# Patient Record
Sex: MALE | Race: WHITE | NOT HISPANIC OR LATINO | URBAN - METROPOLITAN AREA
[De-identification: names, ages, dates, MRNs, and addresses within clinical notes are randomized per-mention and may not be internally consistent; named-entity substitution may affect disease eponyms.]

---

## 2017-01-06 ENCOUNTER — COMMUNICATION - HEALTHEAST (OUTPATIENT)
Dept: INTERNAL MEDICINE | Facility: CLINIC | Age: 57
End: 2017-01-06

## 2017-01-09 ENCOUNTER — COMMUNICATION - HEALTHEAST (OUTPATIENT)
Dept: INTERNAL MEDICINE | Facility: CLINIC | Age: 57
End: 2017-01-09

## 2018-01-02 ENCOUNTER — COMMUNICATION - HEALTHEAST (OUTPATIENT)
Dept: INTERNAL MEDICINE | Facility: CLINIC | Age: 58
End: 2018-01-02

## 2018-01-02 DIAGNOSIS — F34.1 DYSTHYMIC DISORDER: ICD-10-CM

## 2018-05-01 ENCOUNTER — COMMUNICATION - HEALTHEAST (OUTPATIENT)
Dept: INTERNAL MEDICINE | Facility: CLINIC | Age: 58
End: 2018-05-01
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
## 2018-05-11 ENCOUNTER — RECORDS - HEALTHEAST (OUTPATIENT)
Dept: GENERAL RADIOLOGY | Facility: CLINIC | Age: 58
End: 2018-05-11
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
## 2018-05-23 ENCOUNTER — OFFICE VISIT - HEALTHEAST (OUTPATIENT)
Dept: INTERNAL MEDICINE | Facility: CLINIC | Age: 58
End: 2018-05-23

## 2018-05-23 DIAGNOSIS — F34.1 DYSTHYMIC DISORDER: ICD-10-CM

## 2018-05-23 DIAGNOSIS — Z13.1 ENCOUNTER FOR SCREENING FOR DIABETES MELLITUS: ICD-10-CM

## 2018-05-23 DIAGNOSIS — Z13.220 ENCOUNTER FOR SCREENING FOR LIPOID DISORDERS: ICD-10-CM

## 2018-05-23 DIAGNOSIS — E55.9 VITAMIN D DEFICIENCY: ICD-10-CM

## 2018-05-23 DIAGNOSIS — Z12.5 ENCOUNTER FOR PROSTATE CANCER SCREENING: ICD-10-CM

## 2018-05-23 DIAGNOSIS — Z00.00 ENCOUNTER FOR GENERAL ADULT MEDICAL EXAMINATION WITHOUT ABNORMAL FINDINGS: ICD-10-CM

## 2018-05-23 LAB
CHOLEST SERPL-MCNC: 181 MG/DL
FASTING STATUS PATIENT QL REPORTED: YES
FASTING STATUS PATIENT QL REPORTED: YES
GLUCOSE BLD-MCNC: 108 MG/DL (ref 70–99)
HDLC SERPL-MCNC: 54 MG/DL
LDLC SERPL CALC-MCNC: 116 MG/DL
PSA SERPL-MCNC: 0.9 NG/ML (ref 0–3.5)
TRIGL SERPL-MCNC: 53 MG/DL

## 2018-05-23 ASSESSMENT — MIFFLIN-ST. JEOR: SCORE: 1701.79

## 2018-05-24 LAB
25(OH)D3 SERPL-MCNC: 42.8 NG/ML (ref 30–80)
25(OH)D3 SERPL-MCNC: 42.8 NG/ML (ref 30–80)

## 2018-06-18 ENCOUNTER — COMMUNICATION - HEALTHEAST (OUTPATIENT)
Dept: INTERNAL MEDICINE | Facility: CLINIC | Age: 58
End: 2018-06-18

## 2018-06-18 DIAGNOSIS — F34.1 DYSTHYMIC DISORDER: ICD-10-CM

## 2018-06-21 ENCOUNTER — COMMUNICATION - HEALTHEAST (OUTPATIENT)
Dept: INTERNAL MEDICINE | Facility: CLINIC | Age: 58
End: 2018-06-21

## 2018-09-17 ENCOUNTER — COMMUNICATION - HEALTHEAST (OUTPATIENT)
Dept: INTERNAL MEDICINE | Facility: CLINIC | Age: 58
End: 2018-09-17

## 2018-09-17 DIAGNOSIS — F34.1 DYSTHYMIC DISORDER: ICD-10-CM

## 2019-01-07 ENCOUNTER — COMMUNICATION - HEALTHEAST (OUTPATIENT)
Dept: INTERNAL MEDICINE | Facility: CLINIC | Age: 59
End: 2019-01-07

## 2019-01-07 DIAGNOSIS — F34.1 DYSTHYMIC DISORDER: ICD-10-CM

## 2019-07-01 ENCOUNTER — COMMUNICATION - HEALTHEAST (OUTPATIENT)
Dept: INTERNAL MEDICINE | Facility: CLINIC | Age: 59
End: 2019-07-01

## 2019-07-01 DIAGNOSIS — F34.1 DYSTHYMIC DISORDER: ICD-10-CM

## 2019-09-12 ENCOUNTER — COMMUNICATION - HEALTHEAST (OUTPATIENT)
Dept: INTERNAL MEDICINE | Facility: CLINIC | Age: 59
End: 2019-09-12

## 2019-09-12 DIAGNOSIS — F34.1 DYSTHYMIC DISORDER: ICD-10-CM

## 2019-09-13 RX ORDER — MIRTAZAPINE 30 MG/1
TABLET, FILM COATED ORAL
Qty: 45 TABLET | Refills: 0 | Status: SHIPPED | OUTPATIENT
Start: 2019-09-13

## 2021-05-29 ENCOUNTER — RECORDS - HEALTHEAST (OUTPATIENT)
Dept: ADMINISTRATIVE | Facility: CLINIC | Age: 61
End: 2021-05-29

## 2021-05-30 ENCOUNTER — RECORDS - HEALTHEAST (OUTPATIENT)
Dept: ADMINISTRATIVE | Facility: CLINIC | Age: 61
End: 2021-05-30

## 2021-05-30 NOTE — TELEPHONE ENCOUNTER
RN cannot approve Refill Request    RN can NOT refill this medication overdue for office visits and/or labs.    Zion Rinaldi, [facility] Connection Triage/Med Refill 7/1/2019    Requested Prescriptions   Pending Prescriptions Disp Refills     mirtazapine (REMERON) 30 MG tablet [Pharmacy Med Name: MIRTAZAPINE 30MG TABLETS] 45 tablet 0     Sig: TAKE 1/2 TABLET BY MOUTH EVERY NIGHT AT BEDTIME       Tricyclics/Misc Antidepressant/Antianxiety Meds Refill Protocol Failed - 7/1/2019  8:51 AM        Failed - PCP or prescribing provider visit in last year     Last office visit with prescriber/PCP: Visit date not found OR same dept: Visit date not found OR same specialty: Visit date not found  Last physical: 5/23/2018 Last MTM visit: Visit date not found   Next visit within 3 mo: Visit date not found  Next physical within 3 mo: Visit date not found  Prescriber OR PCP: Joe Chaudhari MD  Last diagnosis associated with med order: 1. Depression With Anxiety  - mirtazapine (REMERON) 30 MG tablet [Pharmacy Med Name: MIRTAZAPINE 30MG TABLETS]; TAKE 1/2 TABLET BY MOUTH EVERY NIGHT AT BEDTIME  Dispense: 45 tablet; Refill: 0    If protocol passes may refill for 12 months if within 3 months of last provider visit (or a total of 15 months).

## 2021-06-01 VITALS — BODY MASS INDEX: 24.6 KG/M2 | WEIGHT: 185.6 LBS | HEIGHT: 73 IN

## 2021-06-01 NOTE — TELEPHONE ENCOUNTER
RN cannot approve Refill Request    RN can NOT refill this medication Protocol failed and NO refill given. Last office visit: Visit date not found Last Physical: 5/23/2018 Last MTM visit: Visit date not found Last visit same specialty: Visit date not found.  Next visit within 3 mo: Visit date not found  Next physical within 3 mo: Visit date not found      Breana Zhou, [facility] Connection Triage/Med Refill 9/12/2019    Requested Prescriptions   Pending Prescriptions Disp Refills     mirtazapine (REMERON) 30 MG tablet [Pharmacy Med Name: MIRTAZAPINE 30MG TABLETS] 45 tablet 0     Sig: TAKE 1/2 TABLET BY MOUTH EVERY NIGHT AT BEDTIME       Tricyclics/Misc Antidepressant/Antianxiety Meds Refill Protocol Failed - 9/12/2019 10:23 PM        Failed - PCP or prescribing provider visit in last year     Last office visit with prescriber/PCP: Visit date not found OR same dept: Visit date not found OR same specialty: Visit date not found  Last physical: 5/23/2018 Last MTM visit: Visit date not found   Next visit within 3 mo: Visit date not found  Next physical within 3 mo: Visit date not found  Prescriber OR PCP: Joe Chaudhari MD  Last diagnosis associated with med order: 1. Depression With Anxiety  - mirtazapine (REMERON) 30 MG tablet [Pharmacy Med Name: MIRTAZAPINE 30MG TABLETS]; TAKE 1/2 TABLET BY MOUTH EVERY NIGHT AT BEDTIME  Dispense: 45 tablet; Refill: 0    If protocol passes may refill for 12 months if within 3 months of last provider visit (or a total of 15 months).

## 2021-06-18 NOTE — PROGRESS NOTES
ASSESSMENT:  1. Encounter for general adult medical examination without abnormal findings  Doing well.:  Up-to-date.  Update PSA and labs.  Moving to [place] tomorrow    2. Depression With Anxiety  Printed prescription provided  - mirtazapine (REMERON) 30 MG tablet; Take 0.5 tablets (15 mg total) by mouth at bedtime.  Dispense: 45 tablet; Refill: 3    3. Encounter for prostate cancer screening  Update lab  - PSA (Prostatic-Specific Antigen), Annual Screen    4. Encounter for screening for diabetes mellitus  Update lab  - Glucose    5. Encounter for screening for lipoid disorders  Update lab  - Lipid Cascade    6. Vitamin D deficiency  Update lab  - Vitamin D, Total (25-Hydroxy)        PLAN:  Patient Instructions   You will be due for colonoscopy in 2011 plus 10 is 2021      Orders Placed This Encounter   Procedures     PSA (Prostatic-Specific Antigen), Annual Screen     Glucose     Lipid Cascade     Order Specific Question:   Fasting is required?     Answer:   Yes     Vitamin D, Total (25-Hydroxy)     Medications Discontinued During This Encounter   Medication Reason     gabapentin (NEURONTIN) 100 MG capsule Reorder     mirtazapine (REMERON) 30 MG tablet Reorder       No Follow-up on file.    CHIEF COMPLAINT:  Chief Complaint   Patient presents with     Annual Exam       HISTORY OF PRESENT ILLNESS:  Óscar is a 57 y.o. male presenting to the clinic today for a physical exam.  He is a .  He and his wife are moving to [place] in the [place] area tomorrow.  He reports feeling well    He has occasional mild chronic back pain    He sleeps well with the assistance of medications.  These been stable for years    REVIEW OF SYSTEMS:   No peripheral edema.  Normal bowels.  Normal bladder.  Sleeping well with the aid of medication.  No new symptoms.  All other systems are negative.    PFSH:  Does not smoke.  Appears completely administered.  Moving to [place] tomorrow.    History   Smoking Status  "    Never Smoker   Smokeless Tobacco     Never Used       Family History   Problem Relation Age of Onset     Lung cancer Mother      Pancreatic cancer Father 76     Jan 2008     Prostate cancer Father        Social History     Social History     Marital status:      Spouse name: N/A     Number of children: 1     Years of education: N/A     Occupational History     Hospital Employee      [employer]     Social History Main Topics     Smoking status: Never Smoker     Smokeless tobacco: Never Used     Alcohol use 3.0 - 3.6 oz/week     5 - 6 Glasses of wine per week     Drug use: Not on file     Sexual activity: Not on file     Other Topics Concern     Not on file     Social History Narrative       Past Surgical History:   Procedure Laterality Date     INGUINAL HERNIA REPAIR       TX EXCIS CERV DISK,ONE LEVEL      Description: Laminectomy With Disc Removal;  Proc Date: 11/21/2005;  Comments: L 4- L 5     TX REMOVE TONSILS/ADENOIDS,<13 Y/O      Description: Tonsillectomy With Adenoidectomy;  Recorded: 07/16/2008;       No Known Allergies    Active Ambulatory Problems     Diagnosis Date Noted     Vitamin B12 Deficiency      Vitamin D Deficiency      Insomnia      Depression With Anxiety      Lower Back Pain      Resolved Ambulatory Problems     Diagnosis Date Noted     Tachycardia      Past Medical History:   Diagnosis Date     Closed fracture        VITALS:  Vitals:    05/23/18 1005   BP: 124/78   Patient Site: Left Arm   Patient Position: Sitting   Cuff Size: Adult Regular   Pulse: 74   SpO2: 99%   Weight: 185 lb 9.6 oz (84.2 kg)   Height: 6' 0.75\" (1.848 m)     Wt Readings from Last 3 Encounters:   05/23/18 185 lb 9.6 oz (84.2 kg)   12/29/16 189 lb 6.4 oz (85.9 kg)     Body mass index is 24.66 kg/(m^2).    PHYSICAL EXAM:  Constitutional:  Reveals pleasant healthy middle-aged man.  Vitals:  Per nursing notes.  Ears:  Clear.  Oropharynx:  Without posterior nasal drainage or thrush.  Neck:  Supple, " thyroid not palpable.  Cardiac:  Regular rate and rhythm without murmurs, rubs, or gallops. Carotids without bruits. Legs without edema. Palpation of the radial pulse regular.  Lungs: Clear.  Respiratory effort normal.  Abdomen:   Bowel sounds positive, nontender, nondistended.  Neither liver nor spleen palpable.  Skin:   Without rash, bruise, or palpable lesions.  Rheumatologic: Normal joints and nails of the hands.  Neurologic:  Cranial nerves II-XII intact.     Psychiatric:  Mood appropriate, memory intact.     QUALITY MEASURES:  The following high BMI interventions were performed this visit: encouragement to exercise    ADDITIONAL HISTORY SUMMARIZED (2): None.  DECISION TO OBTAIN EXTRA INFORMATION (1): None.   RADIOLOGY TESTS (1): None.  LABS (1): None.  MEDICINE TESTS (1): None.  INDEPENDENT REVIEW (2 each): None.         MEDICATIONS:  Current Outpatient Prescriptions   Medication Sig Dispense Refill     gabapentin (NEURONTIN) 100 MG capsule Take 200 mg by mouth at bedtime. 180 capsule 3     mirtazapine (REMERON) 30 MG tablet Take 0.5 tablets (15 mg total) by mouth at bedtime. 45 tablet 3     No current facility-administered medications for this visit.        Total data points:2

## 2021-06-22 NOTE — TELEPHONE ENCOUNTER
Refill Approved    Rx renewed per Medication Renewal Policy. Medication was last renewed on 6/19/18.    Ashley Bolden, [facility] Connection Triage/Med Refill 1/9/2019     Requested Prescriptions   Pending Prescriptions Disp Refills     mirtazapine (REMERON) 30 MG tablet [Pharmacy Med Name: MIRTAZAPINE 30MG TABLETS] 45 tablet 0     Sig: TAKE ONE-HALF TABLETS BY MOUTH EVERY NIGHT AT BEDTIME    Tricyclics/Misc Antidepressant/Antianxiety Meds Refill Protocol Passed - 1/7/2019  6:58 PM       Passed - PCP or prescribing provider visit in last year    Last office visit with prescriber/PCP: Visit date not found OR same dept: Visit date not found OR same specialty: Visit date not found  Last physical: 5/23/2018 Last MTM visit: Visit date not found   Next visit within 3 mo: Visit date not found  Next physical within 3 mo: Visit date not found  Prescriber OR PCP: Joe Chaudhari MD  Last diagnosis associated with med order: 1. Depression With Anxiety  - mirtazapine (REMERON) 30 MG tablet [Pharmacy Med Name: MIRTAZAPINE 30MG TABLETS]; TAKE ONE-HALF TABLETS BY MOUTH EVERY NIGHT AT BEDTIME  Dispense: 45 tablet; Refill: 0    If protocol passes may refill for 12 months if within 3 months of last provider visit (or a total of 15 months).

## 2021-08-22 ENCOUNTER — HEALTH MAINTENANCE LETTER (OUTPATIENT)
Age: 61
End: 2021-08-22

## 2021-10-17 ENCOUNTER — HEALTH MAINTENANCE LETTER (OUTPATIENT)
Age: 61
End: 2021-10-17

## 2021-11-17 ENCOUNTER — OFFICE VISIT (OUTPATIENT)
Dept: URBAN - METROPOLITAN AREA CLINIC 56 | Facility: CLINIC | Age: 61
End: 2021-11-17
Payer: COMMERCIAL

## 2021-11-17 DIAGNOSIS — D31.32 BENIGN NEOPLASM OF LEFT CHOROID: ICD-10-CM

## 2021-11-17 DIAGNOSIS — H25.13 AGE-RELATED NUCLEAR CATARACT, BILATERAL: ICD-10-CM

## 2021-11-17 DIAGNOSIS — H43.812 VITREOUS DEGENERATION, LEFT EYE: Primary | ICD-10-CM

## 2021-11-17 PROCEDURE — 92134 CPTRZ OPH DX IMG PST SGM RTA: CPT | Performed by: OPTOMETRIST

## 2021-11-17 PROCEDURE — 92004 COMPRE OPH EXAM NEW PT 1/>: CPT | Performed by: OPTOMETRIST

## 2021-11-17 ASSESSMENT — INTRAOCULAR PRESSURE
OS: 16
OD: 16

## 2021-11-17 NOTE — IMPRESSION/PLAN
Impression: Benign neoplasm of left choroid: D31.32. Plan: 0.75 DD flat nevus. Will monitor with annual comprehensive exams and fundus photos.

## 2021-11-17 NOTE — IMPRESSION/PLAN
Impression: Vitreous degeneration, left eye: H43.812. Plan: PVD accounts for patients complaint. There is no evidence of retinal pathology; however, there is a suggestion of an evolving posterior vitreous detachment based on the vitreous syneresis seen in the anterior vitreous and symptoms consistent with separation of the vitreous from the retina. The patient was instructed to monitor symptoms closely and to observe for an increase in amount of severity of flashes and floaters, or the development of new curtains in the periphery of vision. Explained that the symptoms of vitreous degeneration/detachment generally subsides but they can take many months to do so. All signs and risks of retinal detachment or tears were discussed in detail. If patient notices any symptoms discussed, contact office ASAP. Recommend patient return for normal recall.

## 2022-10-02 ENCOUNTER — HEALTH MAINTENANCE LETTER (OUTPATIENT)
Age: 62
End: 2022-10-02

## 2023-10-21 ENCOUNTER — HEALTH MAINTENANCE LETTER (OUTPATIENT)
Age: 63
End: 2023-10-21